# Patient Record
Sex: MALE | Race: BLACK OR AFRICAN AMERICAN | Employment: UNEMPLOYED | URBAN - METROPOLITAN AREA
[De-identification: names, ages, dates, MRNs, and addresses within clinical notes are randomized per-mention and may not be internally consistent; named-entity substitution may affect disease eponyms.]

---

## 2021-04-06 ENCOUNTER — APPOINTMENT (OUTPATIENT)
Dept: GENERAL RADIOLOGY | Facility: HOSPITAL | Age: 31
End: 2021-04-06
Attending: EMERGENCY MEDICINE
Payer: MEDICAID

## 2021-04-06 ENCOUNTER — HOSPITAL ENCOUNTER (EMERGENCY)
Facility: HOSPITAL | Age: 31
Discharge: HOME | End: 2021-04-06
Attending: EMERGENCY MEDICINE
Payer: MEDICAID

## 2021-04-06 VITALS
DIASTOLIC BLOOD PRESSURE: 77 MMHG | RESPIRATION RATE: 16 BRPM | HEART RATE: 75 BPM | SYSTOLIC BLOOD PRESSURE: 110 MMHG | TEMPERATURE: 98 F | BODY MASS INDEX: 31.14 KG/M2 | WEIGHT: 220 LBS | HEIGHT: 70.5 IN | OXYGEN SATURATION: 96 %

## 2021-04-06 DIAGNOSIS — T40.601A OPIATE OVERDOSE, ACCIDENTAL OR UNINTENTIONAL, INITIAL ENCOUNTER (HCC): Primary | ICD-10-CM

## 2021-04-06 PROCEDURE — 80307 DRUG TEST PRSMV CHEM ANLYZR: CPT | Performed by: EMERGENCY MEDICINE

## 2021-04-06 PROCEDURE — 81001 URINALYSIS AUTO W/SCOPE: CPT | Performed by: EMERGENCY MEDICINE

## 2021-04-06 PROCEDURE — 80053 COMPREHEN METABOLIC PANEL: CPT | Performed by: EMERGENCY MEDICINE

## 2021-04-06 PROCEDURE — 82077 ASSAY SPEC XCP UR&BREATH IA: CPT | Performed by: EMERGENCY MEDICINE

## 2021-04-06 PROCEDURE — 93010 ELECTROCARDIOGRAM REPORT: CPT

## 2021-04-06 PROCEDURE — 99284 EMERGENCY DEPT VISIT MOD MDM: CPT

## 2021-04-06 PROCEDURE — 71045 X-RAY EXAM CHEST 1 VIEW: CPT | Performed by: EMERGENCY MEDICINE

## 2021-04-06 PROCEDURE — 99285 EMERGENCY DEPT VISIT HI MDM: CPT

## 2021-04-06 PROCEDURE — 80179 DRUG ASSAY SALICYLATE: CPT | Performed by: EMERGENCY MEDICINE

## 2021-04-06 PROCEDURE — 85025 COMPLETE CBC W/AUTO DIFF WBC: CPT | Performed by: EMERGENCY MEDICINE

## 2021-04-06 PROCEDURE — 80143 DRUG ASSAY ACETAMINOPHEN: CPT | Performed by: EMERGENCY MEDICINE

## 2021-04-06 PROCEDURE — 93005 ELECTROCARDIOGRAM TRACING: CPT

## 2021-04-06 PROCEDURE — 36415 COLL VENOUS BLD VENIPUNCTURE: CPT

## 2021-04-06 PROCEDURE — 84484 ASSAY OF TROPONIN QUANT: CPT | Performed by: EMERGENCY MEDICINE

## 2021-04-06 RX ORDER — HYDROXYZINE HYDROCHLORIDE 25 MG/1
25 TABLET, FILM COATED ORAL 2 TIMES DAILY PRN
COMMUNITY

## 2021-04-06 RX ORDER — QUETIAPINE 100 MG/1
100 TABLET, FILM COATED ORAL NIGHTLY
COMMUNITY

## 2021-04-06 RX ORDER — VALPROIC ACID 250 MG/1
500 CAPSULE, LIQUID FILLED ORAL 2 TIMES DAILY
COMMUNITY
End: 2021-11-04

## 2021-04-06 RX ORDER — ARIPIPRAZOLE 400 MG
400 KIT INTRAMUSCULAR
COMMUNITY
End: 2021-11-04

## 2021-04-06 RX ORDER — QUETIAPINE 50 MG/1
50 TABLET, FILM COATED ORAL EVERY MORNING
COMMUNITY
End: 2021-11-04

## 2021-04-06 NOTE — ED PROVIDER NOTES
Patient Seen in: [facility] Emergency Department      History   Patient presents with:  Poisoning/Overdose    Stated Complaint: od    HPI/Subjective:   HPI    80-year-old male here for possible overdose.   Reportedly family noted snoring respirations Reviewed   COMP METABOLIC PANEL (14) - Abnormal; Notable for the following components:       Result Value    Glucose 271 (*)     Creatinine 1.44 (*)     Calcium, Total 8.4 (*)     AST 38 (*)     All other components within normal limits   DRUG SCREEN 7 W/O Report. Rate: 79  Rhythm: Sinus Rhythm  Reading: Normal EKG              Urine drug screen is positive for opiates cocaine and. Rest the labs are unremarkable.          MDM      Patient admits to using drugs and seem to have reversal of his decreased resp

## 2021-04-06 NOTE — PATIENT INSTRUCTIONS
Naloxone Patient Education - Adult Medication  Brand Names: Evzio (intramuscular injection); Narcan (nasal spray)    What is this drug used for? • Naloxone is an opioid antidote. It is used to treat an opioid overdose.   Opioids are pain medications th The pharmacist may have to order it for you if it is not currently in stock.

## 2021-04-06 NOTE — ED INITIAL ASSESSMENT (HPI)
Per EMS pts mother called [phone], pt had snoring respirations and was unresponsive. Narcan x2 was given. Pt AOx3.

## 2021-04-06 NOTE — ED QUICK NOTES
Pt sitting up in bed eating sandwich and an orange. Pt updated on POC. Declines any needs at this time.

## 2021-06-10 NOTE — BH LEVEL OF CARE ASSESSMENT
Crisis Evaluation Assessment    Eva Nobles YOB: 1990   Age 27year old MRN [id number]   Location [facility] Attending Orlin Cortes MD      Patient's legal sex: male  Patient identifies as: male  Shawn Kenny of information for CSSR: Patient  In what setting is the screener performed?: in person  1. Have you wished you were dead or wished you could go to sleep and not wake up? (past 30 days): No  2.  Have you actually had any thoughts of killing yourself? (past down, insomnia, and less interested in hobbies. Pt reports his appetite is good. Pt admits to impulsivity and poor judgement. Substance Use:  Pt reports using marijuana daily. He reports he smoked yesterday.  He reports he uses a pipe or a vape Assessment  Physical Abuse: Denies  Verbal Abuse: Denies  Sexual Abuse: Denies  Neglect: Denies  Does anyone say or do something to you that makes you feel unsafe?: No  Have You Ever Been Harmed by a Partner/Caregiver?: No  Health Concerns r/t Abuse: No  P of suicide attempts. CSSR score was a 0. Pt denies current HI or SIB. Pt reports marijuana use and states he also huffs. Pt states he has a hx of experiencing AH, but has not experienced that recently. Pt reports current sxs of paranoia.  Pt reports he has
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
## 2021-06-10 NOTE — ED NOTES
October 3, 2019    Damien Adan  [address]  [place] [address]        Dear Ms. Molly Adan,     I am writing to inform you of the results of recent testing that Damien had done.  None of Damien's food allergy test results are strongly positive.  He can continue to eat soybean protein & pastries that contain eggs since they do not bother him as his Mother has reported to me.   Cow's milk is also often found in the pastries that he eats & he has not experienced any reaction when consuming them.  Therefore, it appears that despite the weakly positive test results for eggs & cow's milk, his real-life experience has not demonstrated that he is allergic to these foods .  He does not have to discontinue eating any of the foods for which the test results are positive if he regularly has eaten them & they have not bothered him.     He must continue carrying auto injectable epinephrine 2-Junaid, Jr at all times.     He has strong allergic sensitivity to dog dander & to mold.  He has moderate sensitivity to cat dander & to 1 of the tree pollens.  All of his other envirionmental allergy test results are unimpressive.     Thanks.     PILY     If you have any further questions please call me at [phone]    Sincerely,    Electronically signed    Heriberto Ferro MD   Spoke to ED PCT and pt still sleeping.  ED staff will call [facility] when pt is awake and assessable

## 2021-07-25 ENCOUNTER — HOSPITAL ENCOUNTER (OUTPATIENT)
Facility: HOSPITAL | Age: 31
Setting detail: OBSERVATION
Discharge: LEFT BEFORE DISCHARGE | End: 2021-07-27
Attending: EMERGENCY MEDICINE | Admitting: HOSPITALIST
Payer: MEDICAID

## 2021-07-25 ENCOUNTER — APPOINTMENT (OUTPATIENT)
Dept: GENERAL RADIOLOGY | Facility: HOSPITAL | Age: 31
End: 2021-07-25
Attending: EMERGENCY MEDICINE
Payer: MEDICAID

## 2021-07-25 ENCOUNTER — ANESTHESIA EVENT (OUTPATIENT)
Dept: SURGERY | Facility: HOSPITAL | Age: 31
End: 2021-07-25
Payer: MEDICAID

## 2021-07-25 DIAGNOSIS — S81.812A LACERATION OF LEFT LOWER EXTREMITY, INITIAL ENCOUNTER: ICD-10-CM

## 2021-07-25 DIAGNOSIS — T14.8XXA STAB WOUND: Primary | ICD-10-CM

## 2021-07-25 DIAGNOSIS — S81.012A: ICD-10-CM

## 2021-07-25 DIAGNOSIS — E87.2 METABOLIC ACIDOSIS: ICD-10-CM

## 2021-07-25 LAB
ALBUMIN SERPL-MCNC: 4.4 G/DL (ref 3.4–5)
ALBUMIN/GLOB SERPL: 1.1 {RATIO} (ref 1–2)
ALP LIVER SERPL-CCNC: 83 U/L
ALT SERPL-CCNC: 31 U/L
ANION GAP SERPL CALC-SCNC: 20 MMOL/L (ref 0–18)
AST SERPL-CCNC: 23 U/L (ref 15–37)
BASOPHILS # BLD AUTO: 0.05 X10(3) UL (ref 0–0.2)
BASOPHILS NFR BLD AUTO: 0.5 %
BILIRUB SERPL-MCNC: 0.3 MG/DL (ref 0.1–2)
BUN BLD-MCNC: 10 MG/DL (ref 7–18)
BUN/CREAT SERPL: 5.6 (ref 10–20)
CALCIUM BLD-MCNC: 9.3 MG/DL (ref 8.5–10.1)
CHLORIDE SERPL-SCNC: 106 MMOL/L (ref 98–112)
CO2 SERPL-SCNC: 14 MMOL/L (ref 21–32)
CREAT BLD-MCNC: 1.79 MG/DL
DEPRECATED RDW RBC AUTO: 48.8 FL (ref 35.1–46.3)
EOSINOPHIL # BLD AUTO: 0.06 X10(3) UL (ref 0–0.7)
EOSINOPHIL NFR BLD AUTO: 0.5 %
ERYTHROCYTE [DISTWIDTH] IN BLOOD BY AUTOMATED COUNT: 14.3 % (ref 11–15)
ETHANOL SERPL-MCNC: 27 MG/DL (ref ?–3)
GLOBULIN PLAS-MCNC: 4 G/DL (ref 2.8–4.4)
GLUCOSE BLD-MCNC: 117 MG/DL (ref 70–99)
HCT VFR BLD AUTO: 49.4 %
HGB BLD-MCNC: 16.1 G/DL
IMM GRANULOCYTES # BLD AUTO: 0.07 X10(3) UL (ref 0–1)
IMM GRANULOCYTES NFR BLD: 0.6 %
LYMPHOCYTES # BLD AUTO: 4.9 X10(3) UL (ref 1–4)
LYMPHOCYTES NFR BLD AUTO: 44.2 %
M PROTEIN MFR SERPL ELPH: 8.4 G/DL (ref 6.4–8.2)
MCH RBC QN AUTO: 30.1 PG (ref 26–34)
MCHC RBC AUTO-ENTMCNC: 32.6 G/DL (ref 31–37)
MCV RBC AUTO: 92.3 FL
MONOCYTES # BLD AUTO: 0.71 X10(3) UL (ref 0.1–1)
MONOCYTES NFR BLD AUTO: 6.4 %
NEUTROPHILS # BLD AUTO: 5.3 X10 (3) UL (ref 1.5–7.7)
NEUTROPHILS # BLD AUTO: 5.3 X10(3) UL (ref 1.5–7.7)
NEUTROPHILS NFR BLD AUTO: 47.8 %
OSMOLALITY SERPL CALC.SUM OF ELEC: 290 MOSM/KG (ref 275–295)
PLATELET # BLD AUTO: 225 10(3)UL (ref 150–450)
POTASSIUM SERPL-SCNC: 3.9 MMOL/L (ref 3.5–5.1)
RBC # BLD AUTO: 5.35 X10(6)UL
SARS-COV-2 RNA RESP QL NAA+PROBE: NOT DETECTED
SODIUM SERPL-SCNC: 140 MMOL/L (ref 136–145)
WBC # BLD AUTO: 11.1 X10(3) UL (ref 4–11)

## 2021-07-25 PROCEDURE — 73560 X-RAY EXAM OF KNEE 1 OR 2: CPT | Performed by: EMERGENCY MEDICINE

## 2021-07-25 PROCEDURE — 99219 INITIAL OBSERVATION CARE,LEVL II: CPT | Performed by: STUDENT IN AN ORGANIZED HEALTH CARE EDUCATION/TRAINING PROGRAM

## 2021-07-25 RX ORDER — ONDANSETRON 2 MG/ML
4 INJECTION INTRAMUSCULAR; INTRAVENOUS EVERY 4 HOURS PRN
Status: ACTIVE | OUTPATIENT
Start: 2021-07-25 | End: 2021-07-26

## 2021-07-25 RX ORDER — ACETAMINOPHEN 325 MG/1
650 TABLET ORAL EVERY 6 HOURS PRN
Status: DISCONTINUED | OUTPATIENT
Start: 2021-07-25 | End: 2021-07-27

## 2021-07-25 RX ORDER — QUETIAPINE 25 MG/1
100 TABLET, FILM COATED ORAL NIGHTLY
Status: DISCONTINUED | OUTPATIENT
Start: 2021-07-25 | End: 2021-07-27

## 2021-07-25 RX ORDER — VALPROIC ACID 250 MG/1
500 CAPSULE, LIQUID FILLED ORAL 2 TIMES DAILY
Status: DISCONTINUED | OUTPATIENT
Start: 2021-07-25 | End: 2021-07-27

## 2021-07-25 RX ORDER — IBUPROFEN 600 MG/1
600 TABLET ORAL EVERY 8 HOURS PRN
Qty: 30 TABLET | Refills: 0 | Status: SHIPPED | OUTPATIENT
Start: 2021-07-25 | End: 2021-08-01

## 2021-07-25 RX ORDER — KETOROLAC TROMETHAMINE 30 MG/ML
15 INJECTION, SOLUTION INTRAMUSCULAR; INTRAVENOUS ONCE
Status: COMPLETED | OUTPATIENT
Start: 2021-07-25 | End: 2021-07-25

## 2021-07-25 RX ORDER — SODIUM CHLORIDE 9 MG/ML
1000 INJECTION, SOLUTION INTRAVENOUS ONCE
Status: COMPLETED | OUTPATIENT
Start: 2021-07-25 | End: 2021-07-25

## 2021-07-25 RX ORDER — MORPHINE SULFATE 2 MG/ML
0.5 INJECTION, SOLUTION INTRAMUSCULAR; INTRAVENOUS EVERY 2 HOUR PRN
Status: DISCONTINUED | OUTPATIENT
Start: 2021-07-25 | End: 2021-07-27

## 2021-07-25 RX ORDER — SODIUM CHLORIDE 9 MG/ML
INJECTION, SOLUTION INTRAVENOUS CONTINUOUS
Status: DISCONTINUED | OUTPATIENT
Start: 2021-07-25 | End: 2021-07-27

## 2021-07-25 RX ORDER — MORPHINE SULFATE 2 MG/ML
1 INJECTION, SOLUTION INTRAMUSCULAR; INTRAVENOUS EVERY 2 HOUR PRN
Status: DISCONTINUED | OUTPATIENT
Start: 2021-07-25 | End: 2021-07-27

## 2021-07-25 RX ORDER — SODIUM CHLORIDE 9 MG/ML
INJECTION, SOLUTION INTRAVENOUS CONTINUOUS
Status: ACTIVE | OUTPATIENT
Start: 2021-07-25 | End: 2021-07-26

## 2021-07-25 RX ORDER — MORPHINE SULFATE 2 MG/ML
2 INJECTION, SOLUTION INTRAMUSCULAR; INTRAVENOUS EVERY 2 HOUR PRN
Status: DISCONTINUED | OUTPATIENT
Start: 2021-07-25 | End: 2021-07-27

## 2021-07-25 RX ORDER — HYDROMORPHONE HYDROCHLORIDE 1 MG/ML
0.5 INJECTION, SOLUTION INTRAMUSCULAR; INTRAVENOUS; SUBCUTANEOUS EVERY 30 MIN PRN
Status: ACTIVE | OUTPATIENT
Start: 2021-07-25 | End: 2021-07-26

## 2021-07-25 RX ORDER — CEFADROXIL 500 MG/1
500 CAPSULE ORAL 2 TIMES DAILY
Qty: 20 CAPSULE | Refills: 0 | Status: SHIPPED | OUTPATIENT
Start: 2021-07-25 | End: 2021-08-04

## 2021-07-26 ENCOUNTER — ANESTHESIA (OUTPATIENT)
Dept: SURGERY | Facility: HOSPITAL | Age: 31
End: 2021-07-26
Payer: MEDICAID

## 2021-07-26 LAB
AMPHET UR QL SCN: NEGATIVE
ANION GAP SERPL CALC-SCNC: 5 MMOL/L (ref 0–18)
BENZODIAZ UR QL SCN: NEGATIVE
BUN BLD-MCNC: 14 MG/DL (ref 7–18)
BUN/CREAT SERPL: 9.7 (ref 10–20)
CALCIUM BLD-MCNC: 8.6 MG/DL (ref 8.5–10.1)
CHLORIDE SERPL-SCNC: 109 MMOL/L (ref 98–112)
CO2 SERPL-SCNC: 26 MMOL/L (ref 21–32)
COCAINE UR QL: NEGATIVE
CREAT BLD-MCNC: 1.44 MG/DL
CREAT UR-SCNC: 242 MG/DL
GLUCOSE BLD-MCNC: 90 MG/DL (ref 70–99)
HBV SURFACE AG SER-ACNC: <0.1 [IU]/L
HBV SURFACE AG SERPL QL IA: NONREACTIVE
HCV AB SERPL QL IA: NONREACTIVE
HIV 1+2 AB+HIV1 P24 AG SERPL QL IA: NONREACTIVE
INR BLD: 1.07 (ref 0.89–1.11)
MDMA UR QL SCN: NEGATIVE
OPIATES UR QL SCN: NEGATIVE
OSMOLALITY SERPL CALC.SUM OF ELEC: 290 MOSM/KG (ref 275–295)
OXYCODONE UR QL SCN: NEGATIVE
POTASSIUM SERPL-SCNC: 4.4 MMOL/L (ref 3.5–5.1)
PSA SERPL DL<=0.01 NG/ML-MCNC: 14.2 SECONDS (ref 12.4–14.6)
SODIUM SERPL-SCNC: 140 MMOL/L (ref 136–145)

## 2021-07-26 PROCEDURE — 99225 SUBSEQUENT OBSERVATION CARE: CPT | Performed by: HOSPITALIST

## 2021-07-26 PROCEDURE — 0SCD0ZZ EXTIRPATION OF MATTER FROM LEFT KNEE JOINT, OPEN APPROACH: ICD-10-PCS | Performed by: ORTHOPAEDIC SURGERY

## 2021-07-26 RX ORDER — BISACODYL 10 MG
10 SUPPOSITORY, RECTAL RECTAL
Status: DISCONTINUED | OUTPATIENT
Start: 2021-07-26 | End: 2021-07-27

## 2021-07-26 RX ORDER — SODIUM CHLORIDE, SODIUM LACTATE, POTASSIUM CHLORIDE, CALCIUM CHLORIDE 600; 310; 30; 20 MG/100ML; MG/100ML; MG/100ML; MG/100ML
INJECTION, SOLUTION INTRAVENOUS CONTINUOUS
Status: DISCONTINUED | OUTPATIENT
Start: 2021-07-26 | End: 2021-07-26 | Stop reason: HOSPADM

## 2021-07-26 RX ORDER — METOCLOPRAMIDE HYDROCHLORIDE 5 MG/ML
10 INJECTION INTRAMUSCULAR; INTRAVENOUS AS NEEDED
Status: DISCONTINUED | OUTPATIENT
Start: 2021-07-26 | End: 2021-07-26 | Stop reason: HOSPADM

## 2021-07-26 RX ORDER — OXYCODONE HYDROCHLORIDE 5 MG/1
5 TABLET ORAL EVERY 4 HOURS PRN
Status: DISCONTINUED | OUTPATIENT
Start: 2021-07-26 | End: 2021-07-27

## 2021-07-26 RX ORDER — OXYCODONE HYDROCHLORIDE 10 MG/1
10 TABLET ORAL EVERY 4 HOURS PRN
Status: DISCONTINUED | OUTPATIENT
Start: 2021-07-26 | End: 2021-07-27

## 2021-07-26 RX ORDER — METOCLOPRAMIDE HYDROCHLORIDE 5 MG/ML
INJECTION INTRAMUSCULAR; INTRAVENOUS AS NEEDED
Status: DISCONTINUED | OUTPATIENT
Start: 2021-07-26 | End: 2021-07-26 | Stop reason: SURG

## 2021-07-26 RX ORDER — CEFAZOLIN SODIUM/WATER 2 G/20 ML
2 SYRINGE (ML) INTRAVENOUS EVERY 8 HOURS
Status: DISCONTINUED | OUTPATIENT
Start: 2021-07-26 | End: 2021-07-27

## 2021-07-26 RX ORDER — DIPHENHYDRAMINE HYDROCHLORIDE 50 MG/ML
INJECTION INTRAMUSCULAR; INTRAVENOUS AS NEEDED
Status: DISCONTINUED | OUTPATIENT
Start: 2021-07-26 | End: 2021-07-26 | Stop reason: SURG

## 2021-07-26 RX ORDER — POLYETHYLENE GLYCOL 3350 17 G/17G
17 POWDER, FOR SOLUTION ORAL DAILY PRN
Status: DISCONTINUED | OUTPATIENT
Start: 2021-07-26 | End: 2021-07-27

## 2021-07-26 RX ORDER — HYDROCODONE BITARTRATE AND ACETAMINOPHEN 5; 325 MG/1; MG/1
1 TABLET ORAL AS NEEDED
Status: DISCONTINUED | OUTPATIENT
Start: 2021-07-26 | End: 2021-07-26 | Stop reason: HOSPADM

## 2021-07-26 RX ORDER — NALOXONE HYDROCHLORIDE 0.4 MG/ML
80 INJECTION, SOLUTION INTRAMUSCULAR; INTRAVENOUS; SUBCUTANEOUS AS NEEDED
Status: DISCONTINUED | OUTPATIENT
Start: 2021-07-26 | End: 2021-07-26 | Stop reason: HOSPADM

## 2021-07-26 RX ORDER — CEFAZOLIN SODIUM 1 G/3ML
INJECTION, POWDER, FOR SOLUTION INTRAMUSCULAR; INTRAVENOUS AS NEEDED
Status: DISCONTINUED | OUTPATIENT
Start: 2021-07-26 | End: 2021-07-26 | Stop reason: HOSPADM

## 2021-07-26 RX ORDER — NICOTINE 21 MG/24HR
1 PATCH, TRANSDERMAL 24 HOURS TRANSDERMAL DAILY
Status: DISCONTINUED | OUTPATIENT
Start: 2021-07-26 | End: 2021-07-27

## 2021-07-26 RX ORDER — SODIUM PHOSPHATE, DIBASIC AND SODIUM PHOSPHATE, MONOBASIC 7; 19 G/133ML; G/133ML
1 ENEMA RECTAL ONCE AS NEEDED
Status: DISCONTINUED | OUTPATIENT
Start: 2021-07-26 | End: 2021-07-27

## 2021-07-26 RX ORDER — HYDROXYZINE HYDROCHLORIDE 25 MG/1
25 TABLET, FILM COATED ORAL 2 TIMES DAILY PRN
Status: DISCONTINUED | OUTPATIENT
Start: 2021-07-26 | End: 2021-07-27

## 2021-07-26 RX ORDER — HYDROMORPHONE HYDROCHLORIDE 1 MG/ML
0.4 INJECTION, SOLUTION INTRAMUSCULAR; INTRAVENOUS; SUBCUTANEOUS EVERY 2 HOUR PRN
Status: DISCONTINUED | OUTPATIENT
Start: 2021-07-26 | End: 2021-07-27

## 2021-07-26 RX ORDER — ASPIRIN 325 MG
325 TABLET, DELAYED RELEASE (ENTERIC COATED) ORAL 2 TIMES DAILY
Status: DISCONTINUED | OUTPATIENT
Start: 2021-07-26 | End: 2021-07-27

## 2021-07-26 RX ORDER — KETOROLAC TROMETHAMINE 30 MG/ML
INJECTION, SOLUTION INTRAMUSCULAR; INTRAVENOUS AS NEEDED
Status: DISCONTINUED | OUTPATIENT
Start: 2021-07-26 | End: 2021-07-26 | Stop reason: SURG

## 2021-07-26 RX ORDER — LIDOCAINE HYDROCHLORIDE 10 MG/ML
INJECTION, SOLUTION EPIDURAL; INFILTRATION; INTRACAUDAL; PERINEURAL AS NEEDED
Status: DISCONTINUED | OUTPATIENT
Start: 2021-07-26 | End: 2021-07-26 | Stop reason: SURG

## 2021-07-26 RX ORDER — MEPERIDINE HYDROCHLORIDE 25 MG/ML
12.5 INJECTION INTRAMUSCULAR; INTRAVENOUS; SUBCUTANEOUS AS NEEDED
Status: DISCONTINUED | OUTPATIENT
Start: 2021-07-26 | End: 2021-07-26 | Stop reason: HOSPADM

## 2021-07-26 RX ORDER — HYDROCODONE BITARTRATE AND ACETAMINOPHEN 5; 325 MG/1; MG/1
2 TABLET ORAL AS NEEDED
Status: DISCONTINUED | OUTPATIENT
Start: 2021-07-26 | End: 2021-07-26 | Stop reason: HOSPADM

## 2021-07-26 RX ORDER — ONDANSETRON 2 MG/ML
4 INJECTION INTRAMUSCULAR; INTRAVENOUS AS NEEDED
Status: DISCONTINUED | OUTPATIENT
Start: 2021-07-26 | End: 2021-07-26 | Stop reason: HOSPADM

## 2021-07-26 RX ORDER — HYDROMORPHONE HYDROCHLORIDE 1 MG/ML
0.4 INJECTION, SOLUTION INTRAMUSCULAR; INTRAVENOUS; SUBCUTANEOUS EVERY 5 MIN PRN
Status: DISCONTINUED | OUTPATIENT
Start: 2021-07-26 | End: 2021-07-26 | Stop reason: HOSPADM

## 2021-07-26 RX ORDER — DIPHENHYDRAMINE HYDROCHLORIDE 50 MG/ML
12.5 INJECTION INTRAMUSCULAR; INTRAVENOUS AS NEEDED
Status: DISCONTINUED | OUTPATIENT
Start: 2021-07-26 | End: 2021-07-26 | Stop reason: HOSPADM

## 2021-07-26 RX ORDER — HYDROMORPHONE HYDROCHLORIDE 1 MG/ML
0.8 INJECTION, SOLUTION INTRAMUSCULAR; INTRAVENOUS; SUBCUTANEOUS EVERY 2 HOUR PRN
Status: DISCONTINUED | OUTPATIENT
Start: 2021-07-26 | End: 2021-07-27

## 2021-07-26 RX ORDER — DOXEPIN HYDROCHLORIDE 50 MG/1
1 CAPSULE ORAL DAILY
Status: DISCONTINUED | OUTPATIENT
Start: 2021-07-27 | End: 2021-07-27

## 2021-07-26 RX ORDER — LABETALOL HYDROCHLORIDE 5 MG/ML
5 INJECTION, SOLUTION INTRAVENOUS EVERY 5 MIN PRN
Status: DISCONTINUED | OUTPATIENT
Start: 2021-07-26 | End: 2021-07-26 | Stop reason: HOSPADM

## 2021-07-26 RX ORDER — ONDANSETRON 2 MG/ML
4 INJECTION INTRAMUSCULAR; INTRAVENOUS EVERY 4 HOURS PRN
Status: DISCONTINUED | OUTPATIENT
Start: 2021-07-26 | End: 2021-07-27

## 2021-07-26 RX ORDER — DOCUSATE SODIUM 100 MG/1
100 CAPSULE, LIQUID FILLED ORAL 2 TIMES DAILY
Status: DISCONTINUED | OUTPATIENT
Start: 2021-07-26 | End: 2021-07-27

## 2021-07-26 RX ADMIN — SODIUM CHLORIDE: 9 INJECTION, SOLUTION INTRAVENOUS at 07:41:00

## 2021-07-26 RX ADMIN — LIDOCAINE HYDROCHLORIDE 50 MG: 10 INJECTION, SOLUTION EPIDURAL; INFILTRATION; INTRACAUDAL; PERINEURAL at 07:45:00

## 2021-07-26 RX ADMIN — DIPHENHYDRAMINE HYDROCHLORIDE 25 MG: 50 INJECTION INTRAMUSCULAR; INTRAVENOUS at 07:53:00

## 2021-07-26 RX ADMIN — SODIUM CHLORIDE: 9 INJECTION, SOLUTION INTRAVENOUS at 08:38:00

## 2021-07-26 RX ADMIN — KETOROLAC TROMETHAMINE 45 MG: 30 INJECTION, SOLUTION INTRAMUSCULAR; INTRAVENOUS at 08:24:00

## 2021-07-26 RX ADMIN — METOCLOPRAMIDE HYDROCHLORIDE 10 MG: 5 INJECTION INTRAMUSCULAR; INTRAVENOUS at 07:45:00

## 2021-07-26 NOTE — CONSULTS
INFECTIOUS DISEASE CONSULTATION    Belinda Watts Patient Status:  Observation    10/10/1990 MRN [id number]   [facility] 3SW-A Attending Dolores Irizarry MD   Hosp Day # 0 PCP [place] (DILAUDID) 1 MG/ML injection 0.4 mg, 0.4 mg, Intravenous, Q2H PRN **OR** HYDROmorphone HCl (DILAUDID) 1 MG/ML injection 0.8 mg, 0.8 mg, Intravenous, Q2H PRN  •  ceFAZolin sodium (ANCEF/KEFZOL) 2 GM/20ML premix IV syringe 2 g, 2 g, Intravenous, Q8H  •  charlie muscles are intact. Neck: No swelling, no masses  Respiratory: Non labored, symmetric exursion  Cardiovascular: No irregularities in rhythm  Abdomen: Soft, nontender, nondistended.     Musculoskeletal:left knee dressing  hemovac      Laboratory Data:  Labo

## 2021-07-26 NOTE — PROGRESS NOTES
NURSING ADMISSION NOTE      Patient admitted via cart. Pt c/o mild pain to LLE and LUE. Paged MD about further imaging to LUE - no new orders. Oriented to room. Safety precautions initiated. Bed in low position. Call light in reach.  Educated on \"

## 2021-07-26 NOTE — ANESTHESIA PROCEDURE NOTES
Airway  Date/Time: 7/26/2021 7:45 AM  Urgency: elective    Airway not difficult    General Information and Staff    Patient location during procedure: OR  Anesthesiologist: Charmaine Francisco MD  Performed: anesthesiologist     Indications and Patient Condi

## 2021-07-26 NOTE — RESPIRATORY THERAPY NOTE
Smoking Cessation Education:    Patient was counseled on Smoking Cessation discussing multiple strategies including nicotine replacement and pharmacologic replacement.     -total time spent counseling: 10 min [id number]    [] Smoking Cessation Video

## 2021-07-26 NOTE — PLAN OF CARE
Pt A&Ox4. On room air. IS encouraged. VSS. Telemetry monitoring. Pt had I&D of L knee with Dr Sofiya Juárez this AM. L knee with aceherbap tawnya and hemovac drain C/D/I. Ice packs PRN. IV ancef q8. Pt tolerating general diet. Voiding without difficulty.  Pain contro

## 2021-07-26 NOTE — PROGRESS NOTES
Pt requesting a \"smoke break\". Dr Cecile Cleveland paged for nicotine patch. Spoke with Dr Cecile Cleveland and received orders for nicotine patch. See MAR.

## 2021-07-26 NOTE — ANESTHESIA POSTPROCEDURE EVALUATION
[address] [facility] Patient Status:  Observation   Age/Gender 27year old male MRN [id number]   Location 13[address] [facility] Attending Kathryn Lackey MD   Hosp Day # 0 PCP MELBA Henry       Anesthesia Post
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
## 2021-07-26 NOTE — PLAN OF CARE
Pt AOx4. R.A. c/o mild pain to LLE and JOSE Rollins MD paged upon pt arrival to unit for further imaging with no new orders received. Guaze and mepilex dressing, C/D/I. Wound photographed upon arrival - on chart.  WBAT, Up w/ min assist. VS remain stable, voiding
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
## 2021-07-26 NOTE — CONSULTS
[facility]    PATIENT'S NAME: Elle Pinzon   ATTENDING PHYSICIAN: Rickey Dakin, M.D.   [facility] Janell [facility]sorio [facility]    PATIENT ACCOUNT#:   [de-identified]    LOCATION:  PACU 1404 [facility] PACU 5 EDWP 10  MEDICAL RECORD #:   [id number]       BHARATI
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
## 2021-07-26 NOTE — PROGRESS NOTES
Pt states he took his own dose of valporic acid after surgery today. Pt educated on medication safety and how we will supply his medications for him in the hospital. Pt's home medications taken down to pharmacy to be held. Slip placed on pt's chart.
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
## 2021-07-27 ENCOUNTER — HOSPITAL ENCOUNTER (EMERGENCY)
Facility: HOSPITAL | Age: 31
Discharge: LEFT BEFORE DISCHARGE | End: 2021-07-27
Payer: MEDICAID

## 2021-07-27 VITALS
DIASTOLIC BLOOD PRESSURE: 83 MMHG | OXYGEN SATURATION: 97 % | SYSTOLIC BLOOD PRESSURE: 122 MMHG | WEIGHT: 210 LBS | BODY MASS INDEX: 28.44 KG/M2 | RESPIRATION RATE: 16 BRPM | HEART RATE: 77 BPM | TEMPERATURE: 99 F | HEIGHT: 72 IN

## 2021-07-27 VITALS
TEMPERATURE: 99 F | BODY MASS INDEX: 27.2 KG/M2 | HEIGHT: 70 IN | HEART RATE: 82 BPM | SYSTOLIC BLOOD PRESSURE: 129 MMHG | OXYGEN SATURATION: 100 % | DIASTOLIC BLOOD PRESSURE: 72 MMHG | RESPIRATION RATE: 18 BRPM | WEIGHT: 190 LBS

## 2021-07-27 LAB
BASOPHILS # BLD AUTO: 0.02 X10(3) UL (ref 0–0.2)
BASOPHILS NFR BLD AUTO: 0.2 %
DEPRECATED RDW RBC AUTO: 47 FL (ref 35.1–46.3)
EOSINOPHIL # BLD AUTO: 0.03 X10(3) UL (ref 0–0.7)
EOSINOPHIL NFR BLD AUTO: 0.3 %
ERYTHROCYTE [DISTWIDTH] IN BLOOD BY AUTOMATED COUNT: 14.2 % (ref 11–15)
HCT VFR BLD AUTO: 39.1 %
HGB BLD-MCNC: 12.9 G/DL
IMM GRANULOCYTES # BLD AUTO: 0.03 X10(3) UL (ref 0–1)
IMM GRANULOCYTES NFR BLD: 0.3 %
INR BLD: 1.12 (ref 0.89–1.11)
LYMPHOCYTES # BLD AUTO: 3.15 X10(3) UL (ref 1–4)
LYMPHOCYTES NFR BLD AUTO: 27.8 %
MCH RBC QN AUTO: 29.9 PG (ref 26–34)
MCHC RBC AUTO-ENTMCNC: 33 G/DL (ref 31–37)
MCV RBC AUTO: 90.5 FL
MONOCYTES # BLD AUTO: 1.07 X10(3) UL (ref 0.1–1)
MONOCYTES NFR BLD AUTO: 9.4 %
NEUTROPHILS # BLD AUTO: 7.04 X10 (3) UL (ref 1.5–7.7)
NEUTROPHILS # BLD AUTO: 7.04 X10(3) UL (ref 1.5–7.7)
NEUTROPHILS NFR BLD AUTO: 62 %
PLATELET # BLD AUTO: 173 10(3)UL (ref 150–450)
PSA SERPL DL<=0.01 NG/ML-MCNC: 14.7 SECONDS (ref 12.4–14.6)
RBC # BLD AUTO: 4.32 X10(6)UL
WBC # BLD AUTO: 11.3 X10(3) UL (ref 4–11)
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
## 2021-07-27 NOTE — PROGRESS NOTES
Patient sleeping at shift handoff this am. Around 0830, Rn notified that patient is agitated and wanting to be discharged. Patient up and ambulating in room on own, drain dragging on floor, not using walker.  Patient refusing any further antibiotics from RN

## 2021-07-27 NOTE — PROGRESS NOTES
Received verbal order from Dr Richard Recinos to resume pt's home hydroxyzine. Order placed. See MAR.
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
## 2021-07-28 NOTE — ED QUICK NOTES
Pt walking around in room with gown off. Pt given a new gown. Pt asking for ice water, pt provided ice water.
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
## 2021-07-28 NOTE — ED QUICK NOTES
Updated pt regarding current plan of care. Informed pt he was accepted at [facility] and transport would be arranged around 2000. Pt states \"I want my clothes and property. \" RN explained that belongings would ride with the transport team. Pt states

## 2021-07-28 NOTE — ED QUICK NOTES
Mother in ED Waiting room heading home for the night. Aware to keep phone nearby for crisis worker call. Verified Cellphone number with Yayo Pastel # on file.

## 2021-07-28 NOTE — CERTIFICATION
Ref: [facility] [place] 5/3-403, 5/3-602, 5/3-607, 5/3-610    5/3-702, 5/3-813, 5/4-306, 5/4-402, 5/4-403    5/4-405, 5/4-501, 5/4-611, 1/4-949   Inpatient Certificate  Re: Onetha Part    (name)     I personally informed the above-named individual of t behavioral history, to suffer mental or emotional deterioration and is reasonably expected, after such deterioration, to meet the criteria of either paragraph one or paragraph two above;   []  An individual who is developmentally disabled and unless treate
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
## 2021-07-28 NOTE — CONSULTS
[facility]  Psychiatric Consult Note    Eva Juarez YOB: 1990   Age/Gender 27year old male MRN [id number]   Location 656 [facility] Attending Rosario Choi MD    0 PCP ADRIEL Crawford Abilify injectable, concern for medication compliance, patient states he does find these medications beneficial      Past Medical History:   Past Medical History:   Diagnosis Date   • Anxiety    • Bipolar affective ([facility] [address]    • Depression          Family His MG Oral Cap Take 1 capsule (500 mg total) by mouth 2 (two) times daily for 10 days. 20 capsule 0   • Cefadroxil 500 MG Oral Cap Take 1 capsule (500 mg total) by mouth 2 (two) times daily for 10 days.  20 capsule 0   • ibuprofen 600 MG Oral Tab Take 1 tablet

## 2021-07-28 NOTE — ED QUICK NOTES
Pt updated with POC and aware there are 2 people to be seen ahead of him, that it will be a while until he is seen. Pt offered PRN ativan, pt agreeable.

## 2021-07-28 NOTE — ED NOTES
TRANSFER SUMMARY:      CB: Faxed packet for review. KATHY:  This writer contacted [employer]. Faxed packet there is Thirty packets ahead of patient.     [place]:  SRIKANTH

## 2021-07-28 NOTE — ED QUICK NOTES
PT asking to leave AMA, pt aware that he made SI statements to mom, that there is a petition and cert on the chart that takes his decision away to leave AMA. Pt stating he wants a cigarette.  Pt offered a nicotine patch, pt declined

## 2021-07-28 NOTE — ED QUICK NOTES
Pt attempting to exit room with unsteady gait. Writer tried to redirect pt to cart. Asking staff when jer will be here. Questions answered and pt redirected to cart.  Pt now sitting on chair in room

## 2021-07-28 NOTE — ED QUICK NOTES
Nurse to nurse report given to Nader at [facility]. Awaiting phone call regarding admission.
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
## 2021-07-28 NOTE — ED QUICK NOTES
Resumed care of patient, report received from [address] [place] [address]. Pt sleeping on stretcher, awaiting assessment from [facility].

## 2021-07-28 NOTE — BH LEVEL OF CARE ASSESSMENT
Crisis Evaluation Assessment    Eva Sanchez YOB: 1990   Age 27year old MRN [id number]   Location 656 [facility] Attending Michael Presume      Patient's legal sex: male  Patient identifies as: mal of bipolar disorder, anxiety, schizoaffective disorder and depression. He is concerned because he has not had his nightly Depakote and Seroquel.   His mother whom he is very angry at and calling multiple foul names, is here and filling out a petition chance of information for CSSR: Patient  In what setting is the screener performed?: in person  1. Have you wished you were dead or wished you could go to sleep and not wake up? (past 30 days): No  2.  Have you actually had any thoughts of killing yourself? (past gets his Cannabis from a dispensary. Pt shared he usually uses in Cannabis in flower form and uses 1.5 grams a day. Pt shared he did start drinking at 5years old and would socially drink. Pt shared he would get violent when he would drink.  Pt shared he w period?: No  Do you believe yourself to be Fat when others say you are too thin?: No  Would you say that Food dominates your life?: No  SCOFF Score: 0                                                                      Abuse Assessment:  Abuse Assessment Grandiose;Ordinary  Level of Consciousness: Alert  Level of Consciousness: Alert  Behavior  Exhibited behavior: Participated; Appropriate to situation      Disposition: Dr. Luca Hess  @1:35pm     Assessment Summary:   Patient presented to the [facility] ER flower form and uses 1.5 grams a day. Pt shared he did start drinking at 5years old and would socially drink. Pt shared he would get violent when he would drink. Pt shared he would gomez [phone] and shared he is no longer huffing.  Pt shared when he was h

## 2021-08-04 NOTE — DISCHARGE SUMMARY
[facility] HOSPITALIST  DISCHARGE SUMMARY     Glenn Aranza Patient Status:  Observation    10/10/1990 MRN [id number]   [facility] 3SW-A Attending No att. providers found   Hosp Day # 0 PCP MELBA Reynolds     Date of Admission:  (two) times daily as needed for Itching. Refills: 0     QUEtiapine Fumarate 100 MG Tabs  Commonly known as: SEROQUEL      Take 100 mg by mouth nightly.    Refills: 0     QUEtiapine 50 MG Tabs  Commonly known as: SEROQUEL      Take 50 mg by mouth every mor

## 2021-11-04 NOTE — ED NOTES
This writer attempted to meet with patient to complete the Level of Care assessment. Pt was sleeping and unable to stay awake to participate in the assessment. BELLA will attempt to complete the assessment with patient at a later time.

## 2021-11-04 NOTE — BH LEVEL OF CARE ASSESSMENT
Crisis Evaluation Assessment    Eva Krishna YOB: 1990   Age 32year old MRN [id number]   Location 6[address] [facility] Attending Julia Thompson MD      Patient's legal sex: male  Patient identifies as: male House, doesn't like it, gets upset when family members/supports suggest it  - has tried to follow up with substance abuse programming, but loses motivation easily  - \"I should just end it\"  - has noticed in the last few days pt has been saying \"no one w Non-Suicidal Self-Injury:   Pt denies             Access to Means:  Access to Means  Has access to means to attempt suicide or harm others or property: No  Discussion of Removal of Access to Means: Pt denies SI  Access to Firearm/Weapon:  No every 2 weeks  AUD/ALEXIS programming - pt unable to provide   ED programming - pt denies   AA/Community supports - pt denies             Relevant Social History:  Pt identifies as being homeless. Pt reports support systems are grandmother and girlfriend.  Pt mental health treatment in the past. Madeleine Soria also reports working with a psychiatrist for medication management. Ina Zacarias score is 0 (no risk), however pt does appear to be providing guarded/not forthcoming responses and may not be a trustworthy historian.

## 2021-11-04 NOTE — ED QUICK NOTES
Security called to bedside. Pt again asking to speak with someone regarding being discharged. Pt pacing around room, refusing to sit down. Pt states, \"I will get violent\". Pt again offered haldol and pt refusing.  Asked pt if there are any other medica

## 2021-11-04 NOTE — ED NOTES
This writer faxed patients vitals to [address] [place] [address]. This writer confirmed fax received for patient but entire packet not received. This writer resent fax.

## 2021-11-04 NOTE — ED NOTES
This writer contacted [facility] crisis/intake line. This writer discussed that patient's packet was initially faxed to their sister facility[facility]. [facility] does not have a bed request for patient. This writer contacted [facility] line.

## 2021-11-04 NOTE — ED QUICK NOTES
Physician would like Haldol IM administered per order. Pt standing in room, knocking on door. Pt awake and alert, skin w/d,resps reg/unlabored. Pt states he does not want the haldol. Pt states, \"I don't take haldol. It makes your eyes roll back.

## 2021-11-04 NOTE — ED NOTES
This writer contacted [facility] crisis/intake line. This writer spoke with Beula Parents regarding status of referral.      [facility] does not haven an appropriate bed for patient at this time.

## 2021-11-04 NOTE — BH LEVEL OF CARE ASSESSMENT
Crisis Evaluation Assessment    Eva Juarez YOB: 1990   Age 32year old MRN [id number]   Location 6[address] [facility] Attending Elie Varner MD      Patient's legal sex: male  Patient identifies as: male  Pa reaction, quality of appetite/weight gain or loss, and quality and hours of sleep. :8307}        Substance Use:  ***    {Tip Include pattern of substances used with frequency and quantity, most recent use, consequences of use, attitude toward intoxication, Patterns  Clarity/Relevance: Incoherent  Content: Suspicious;Paranoid ideation     Behavior  Exhibited behavior: Aggressive; Compulsive; Hyperactive; Impulsive      Disposition:    Assessment Summary:   ***    {Tip Address the precipitating event and presenti

## 2021-11-04 NOTE — ED PROVIDER NOTES
Patient Seen in: [facility] Emergency Department      History   Patient presents with:  Eval-P    Stated Complaint: eval    Subjective:   HPI    This is a 49-year-old gentleman who presents emergency department for evaluation.   Patient was initially is no disposition time on file for this visit. Follow-up:  No follow-up provider specified.         Medications Prescribed:  Current Discharge Medication List

## 2021-11-04 NOTE — ED NOTES
This writer received an incoming phone call from [facility].  She attempted to complete N2N but was put on hold for a long time. She is asking that nursing Contact her at phone number [phone]. This writer notified nursing.

## 2021-11-04 NOTE — ED PROVIDER NOTES
The patient did wake up and was getting somewhat agitated. He wanted to leave. He was given oral Ativan and then asked for Benadryl. I gave him IM Benadryl as well. That helped for a period of time but then the patient became agitated again.   He was at

## 2021-11-04 NOTE — ED NOTES
This writer received an incoming phone call from [facility].  Patient accepted under Dr. Marti Roy. Transport to be set up at any time.

## 2021-11-04 NOTE — ED PROVIDER NOTES
Patient is a 49-year-old male who had presented to the emergency department for psychiatric evaluation having made comments about wanting to die with associated agitation.   Patient was initially evaluated by my partner, please refer to their documentation

## 2021-11-04 NOTE — ED NOTES
This writer contacted Crisis/intake line. This writer faxed patients vitals to [address] was received. No appropriate bed referral will be sent to [facility].

## 2021-11-04 NOTE — ED INITIAL ASSESSMENT (HPI)
Pt arrives by N[place] with [employer] present. Pt was found on the ground in front of a gas station. Pt arrives hostile and uncooperative.

## 2021-11-04 NOTE — ED NOTES
This writer placed an order for telepsychiatry services.  Patient to be seen this evening, if still in ED.

## 2021-11-04 NOTE — ED NOTES
TRANSFER SUMMARY:    Sophie Johns:  Faxed packet for review. CB:  Packet under review. [facility]ATHY: No appropriate bed for patient. SILVER [facility]:  No available bed for patient will send packet to [facility].    [facility]:  Patient is OON.

## 2021-11-05 NOTE — CM/SW NOTE
Pt mother called requesting information on where the patient was sent. CM notified pt mom that due to HIPPA, information can not be released.

## 2021-11-05 NOTE — ED QUICK NOTES
Pt requests that no information be released to family or friends that may call the ED seeking updates related to care.

## 2022-02-21 NOTE — ED NOTES
Received a call from [address] Aparicio at [facility] stating that there is a possible bed at [facility] in [facility]. Isamar stated that Petition completed by Obi needed to have box 5 checked off. [address] [place] [address] is requesting a new Petition and Cert to be faxed. Isamar stated that they will be calling for RN to RN shortly.

## 2022-02-21 NOTE — ED NOTES
This writer brought the pt a box of tissue due to pt crying. Pt declined wanting anything to do or needing anything other than wanting to leave and get out.

## 2022-02-21 NOTE — ED INITIAL ASSESSMENT (HPI)
PT PRESENTS TO ED AFTER [place] POLICE STATE PT MADE SUICIDAL STATEMENTS, PT STATED HE WAS GOING TO GO TO [facility] AND [facility] AND ALSO MADE A STATEMENT THAT HE WAS GOING TO STAB HIMSELF. PT DENIES SI OR HI UPON ARRIVAL, PT STATES HE WAS JUST MAKING A SCENE.

## 2022-02-21 NOTE — ED NOTES
Called KATHY and referral was made over the phone. Referral Packet faxed for review. [facility] will call back after 4:30 to provide bed status and if referral can be made.

## 2022-02-21 NOTE — BH LEVEL OF CARE ASSESSMENT
Crisis Evaluation Assessment    Eva Lam YOB: 1990   Age 32year old MRN [id number]   Location [facility] Attending Jus Carver MD      Patient's legal sex: male  Patient identifies as: male  Patient's birth sex: male  Preferred pronouns: He/him/his    Date of Service: 2/21/2022    Referral Source:  Pt brought to ED vis [facility]. Reason for Crisis Evaluation   PT PRESENTS TO ED AFTER [place] POLICE STATE PT MADE SUICIDAL STATEMENTS, PT STATED HE WAS GOING TO GO TO [facility] AND [facility] NITROUS AND ALSO MADE A STATEMENT THAT HE WAS GOING TO STAB HIMSELF. PT DENIES SI OR HI UPON ARRIVAL, PT STATES HE WAS JUST MAKING A SCENE. Collateral  Pt grandmotherSusan: [phone] and [place] PD Report by Officer Clio Mom [phone]      Risk to Self or Others  Per collateral, Pt made suicidal statements that he was going to [facility] nitrous and stab himself\". Pt denies current SI, HI and Psychosis. Per collateral, Pt has been making suicidal statements for the past 2 days but has not appeared to be aggressive, paranoid or delusional.      Suicide Risk Assessments:    Source of information for CSSR: Collateral;Patient  In what setting is the screener performed?: telephone  1. Have you wished you were dead or wished you could go to sleep and not wake up? (past 30 days): Yes  2. Have you actually had any thoughts of killing yourself? (past 30 days): Yes  3. Have you been thinking about how you might kill yourself? (past 30 days): Yes  4. Have you had these thoughts and had some intention of acting on them? (past 30 days): Yes  5a. Have you started to work out or worked out the details of how to kill yourself? (past 30 days): No  5b. Do you intend to carry out this plan? (past 30 days): No  6.  Have you ever done anything, started to do anything, or prepared to do anything to end your life? (lifetime): No     Score - BH OV: 7 - High Risk Describe : Per collateral, Pt made suicidal statements that he was going to [facility] nitrous and stab himself\". Per collateral, Pt has not attempted suicide before. Is your experience of thoughts of dying by suicide: Other;Frightening (Pt reports \"I don't know\". Collateral Reports \"It's scary\")  Protective Factors: Community support, friends  Past Suicidal Ideation: Denies            Family History or Personal Lived Experience of Loss or Near Loss by Suicide: Denies              Non-Suicidal Self-Injury:   Pt denies SIB. Access to Means:  Access to Means  Has access to means to attempt suicide or harm others or property: No  Access to Firearm/Weapon: No  Do you have a firearm owner ID card?: No  Collateral for any access to means/firearms/weapons: Pt grandmother, Luis Miguel Guthrie    Protective Factors:   Protective Factors: Community support, friends    Review of Psychiatric Systems:  Pt reports diagnoses of schizoaffective and Bipolar disorder. Pt reports he takes abilify, divalproex and gabapentin as prescribed. Pt denies current Sx of Psychosis. Per collateral, Pt has been making suicidal statements over the past 2 days, has not been working and has not been able to keep up with ADL's. Substance Use:  Pt reports that he smokes marijuana daily and buys it from a dispensary. Pt reports most recent use was yesterday. Pt denies use of alcohol and other substances. Functional Achievement:   Pt reports that he is not currently working. Per collateral, Pt has not been kepeing up with ADL's like cooking, cleaning, showering, paying bills. Current Treatment and Treatment History:  Pt reports psychiatrist is Som Granados. Pt denies having a therapist. Pt reports IP Tx in past. Per collateral, Pt's IP Tx was court ordered due to Sx of Psychosis. Relevant Social History:  Pt denies Hx of abuse/trauma. Pt reports that he is not working.  Pt reports that he feels safe and supported in home he lives in.    Abuse Assessment:  Abuse Assessment  Physical Abuse: Denies  Verbal Abuse: Denies  Sexual Abuse: Denies  Neglect: Denies  Does anyone say or do something to you that makes you feel unsafe?: No  Have You Ever Been Harmed by a Partner/Caregiver?: No  Health Concerns r/t Abuse: No  Possible Abuse Reportable to[de-identified] Not appropriate for reporting to authorities    Mental Status Exam:   General Appearance  Characteristics: Appropriate clothing  Eye Contact: Direct     Mood and Affect  Mood or Feelings: Irritability  Appropriateness of Affect: Congruent to mood  Attitude toward staff: Co-operative; Suspicious  Speech  Rate of Speech: Appropriate  Flow of Speech: Appropriate  Intensity of Volume: Ordinary  Cognition  Insight: Poor  Fair/poor insight as evidenced by: MADE STATEMENTS HE WANTED TO STAB HIMSELF  Judgment: Poor  Fair/poor judgment as evidenced by: MADE STATEMENTS HE WANTED TO STAB HIMSELF  Thought Patterns  Clarity/Relevance: Coherent  Content: Suspicious     Behavior  Exhibited behavior: Elopment behavior;Pacing      Disposition:    Assessment Summary:         Risk/Protective Factors  Protective Factors: Community support, friends                Diagnoses:  Primary Psychiatric Diagnosis  F31.4 Bipolar Disorder, current episode depressed, severe, without psychotic features             Cristy ELLSWORTH

## 2022-02-21 NOTE — ED QUICK NOTES
PT ORDERED FOOD, CALM AND COOPERATIVE, INSTRUCTED ON PLAN OF CARE, WATCHING TV IN ROOM, SITTER AT BEDSIDE DUE TO ELOPEMENT RISK.

## 2022-02-22 NOTE — CERTIFICATION
Ref: [facility] 5/3-403, 5/3-602, 5/3-607, 5/3-610    5/3-702, 5/3-813, 5/4-306, 5/4-402, 5/4-403    5/4-405, 5/4-501, 5/4-611, 6/0-393   Inpatient Certificate  Re: Akiko Egan    (name)     I personally informed the above-named individual of the purpose of this examination and that he or she did not have to speak to me, and that any statements made might be related in court as to the individual's clinical condition or need for services. Additionally, if this examination was for the purpose of determining that the above-named individual is developmentally disabled and dangerous, I informed the individual of his or her right to speak with a relative, friend or  before the examination, and of his or her right to have an  appointed for him or her if he or she so desired. Electronically signed by Rafael Morris MD    Signature of Examiner     On February 21 , 2022 , at 2\"64  [] a.m.  [x] p.m.,  I personally examined the    (date)  (year)  (time)    above-named individual. The examination was conducted at [facility].  Based on the foregoing examination, it is my opinion that he or she is:  [x]  A person with mental illness who, because of his or her illness is reasonably expected, unless treated on an inpatient basis, to engage in conduct placing such person or another in physical harm or in reasonable expectation of being physically harmed;   []  A person with mental illness who, because of his or her illness is unable to provide for his or her basic physical needs so as to guard himself or herself from serious harm, without the assistance of family or others, unless treated on an inpatient basis;   []  A person with mental illness who: refuses treatment or is not adhering adequately to prescribed treatment; because of the nature of his or her illness is unable to understand his or her need for treatment; and if not treated on an inpatient basis, is reasonably expected based on his or her behavioral history, to suffer mental or emotional deterioration and is reasonably expected, after such deterioration, to meet the criteria of either paragraph one or paragraph two above;   []  An individual who is developmentally disabled and unless treated on an in-patient basis is reasonably expected to inflict serious physical harm upon himself or herself or others in the near future, and/or   [x]  Is in need of immediate hospitalization for the prevention of such harm. I base my opinion on the following (including clinical observations, factual information):  I examined the patient via interactive telecommunication system.  31M with depression, suicidal with plans  I believe that the individual is subject to: []  Involuntary inpatient admission and is not in need of immediate hospitalization   (check one) [x]  Involuntary inpatient admission and is in need of immediate hospitalization    []  Judicial inpatient admission and is not in need of immediate hospitalization    []  Judicial inpatient admission and is in need of immediate hospitalization     Date: 2/21/2022 Signature: Electronically signed by Rod Olivas MD   Title: MD Printed Name: Brando Singer [id number] ([id number]) Inpatient Certificate    Printed by [employer]

## 2022-02-22 NOTE — ED QUICK NOTES
[address] contacted for transport of pt to [facility]). ETA ~ 2110.

## 2022-02-22 NOTE — ED NOTES
Telepsych was completed with Dr. Enrique Cr. 2nd Cert and updated Petition has been scanned to chart. Called [facility] and spoke with Janine to make referral.      [facility] ([address] [place] [address]) and [facility] reviewing.

## 2022-02-22 NOTE — ED NOTES
Pt accepted at [facility] in [facility]. Pt accepted under Dr. Africa Wu. He will go to room 1604. They state transport can be called now. ED RN notified.

## 2024-03-03 NOTE — ED QUICK NOTES
A white box of patients medications brought down to pharmacy to secure them.  3 receipt slips placed on pts chart     [id number]  [id number]  [id number]
Beatriz Lacey from [facility] requesting to be called once patient wakes up so she can assess pt
[place] PD at bedside
Crisis worker Cecilia Linger  at bedside
Patient sleeping on stretcher
Pts belongings returned with discharge paperwork containing mental health facility referrals. Pt given additional Gatorade.
Public safety removed the one hitter in patient belongings and disposed of it
4

## 2024-10-01 NOTE — PLAN OF CARE
A&Ox4. VSS. On room air. . IS encouraged. Telemetry monitoring. Last BM 7/25. Voiding freely. Pain managed with PO medications. IV Ancef Q 8. Acewrap to left knee C/D/I. Hemovac drain in place. Patient updated on plan of care.  Safety precautions in plac Detail Level: Zone

## (undated) DEVICE — KIT DRN 1/8IN PVC 3 SPRG EVAC

## (undated) DEVICE — 10K/24K ARTHROSCOPY INFLOW TUBE SET: Brand: 10K/24K

## (undated) DEVICE — KNEE ARTHROSCOPY CDS: Brand: MEDLINE INDUSTRIES, INC.

## (undated) DEVICE — ZIMMER® STERILE DISPOSABLE TOURNIQUET CUFF WITH PLC, DUAL PORT, SINGLE BLADDER, 34 IN. (86 CM)

## (undated) DEVICE — SOL  .9 3000ML

## (undated) DEVICE — PROXIMATE RH ROTATING HEAD SKIN STAPLERS (35 WIDE) CONTAINS 35 STAINLESS STEEL STAPLES: Brand: PROXIMATE

## (undated) DEVICE — NON-ADHERENT STRIPS,OIL EMULSION: Brand: CURITY

## (undated) DEVICE — SUTURE ETHIBOND 1 OS-6

## (undated) DEVICE — KENDALL SCD EXPRESS SLEEVES, KNEE LENGTH, MEDIUM: Brand: KENDALL SCD

## (undated) DEVICE — PADDING CAST COTTON  4

## (undated) DEVICE — SUTURE ETHILON 2-0 FS

## (undated) DEVICE — Device

## (undated) NOTE — LETTER
Gloria Noriega [address]  [address] [facility] [address], [address] [facility]  Authorization for Surgical Operation and Procedure     Date:___________                                                                                                         Time:__________ some, but not all, of the potential risks that can occur: fever and allergic reactions, hemolytic reactions, transmission of diseases such as Hepatitis, AIDS and Cytomegalovirus (CMV) and fluid overload.   In the event that I wish to have an autologous rico or my attending physician will determine when the applicable recovery period ends for purposes of reinstating the DNAR order.   10. Patients having a sterilization procedure: I understand that if the procedure is successful the results will be permanent and to: a. Allow the anesthesiologist (anesthesia doctor) to give me medicine and do additional procedures as necessary.  Some examples are: Starting or using an “IV” to give me medicine, fluids or blood during my procedure, and having a breathing tube placed (“spinal”, “epidural”, & “nerve blocks”): I understand that rare but potential complications include headache, bleeding, infection, seizure, irregular heart rhythms, and nerve injury.     I can change my mind about having anesthesia services at any time be

## (undated) NOTE — LETTER
Date & Time: 6/10/2021, 1:01 PM  Patient: Reba Mera  Encounter Provider(s):    Yandy Smalls MD          I have seen Reba Mera 10/10/1990 and found him medically cleared for incarceration with [facility] Department.